# Patient Record
Sex: FEMALE | NOT HISPANIC OR LATINO | URBAN - METROPOLITAN AREA
[De-identification: names, ages, dates, MRNs, and addresses within clinical notes are randomized per-mention and may not be internally consistent; named-entity substitution may affect disease eponyms.]

---

## 2017-03-31 ENCOUNTER — IMPORTED ENCOUNTER (OUTPATIENT)
Dept: URBAN - METROPOLITAN AREA CLINIC 1 | Facility: CLINIC | Age: 47
End: 2017-03-31

## 2017-03-31 PROBLEM — H52.4: Noted: 2017-03-31

## 2017-03-31 PROCEDURE — S0621 ROUTINE OPHTHALMOLOGICAL EXA: HCPCS

## 2017-03-31 NOTE — PATIENT DISCUSSION
1.  Presbyopia: Rx was given for corrective spectacles if indicated. 2.  Return for an appointment in 1 year for 40. with Dr. Lady Melgar.

## 2018-04-30 ENCOUNTER — IMPORTED ENCOUNTER (OUTPATIENT)
Dept: URBAN - METROPOLITAN AREA CLINIC 1 | Facility: CLINIC | Age: 48
End: 2018-04-30

## 2018-04-30 PROBLEM — H52.4: Noted: 2018-04-30

## 2018-04-30 PROCEDURE — S0621 ROUTINE OPHTHALMOLOGICAL EXA: HCPCS

## 2018-04-30 NOTE — PATIENT DISCUSSION
1.  Presbyopia: Rx was given for corrective spectacles if indicated. 2.  Return for an appointment in 1 year for 40. with Dr. Rudi Bella.

## 2019-05-01 ENCOUNTER — IMPORTED ENCOUNTER (OUTPATIENT)
Dept: URBAN - METROPOLITAN AREA CLINIC 1 | Facility: CLINIC | Age: 49
End: 2019-05-01

## 2019-05-01 PROBLEM — H52.13: Noted: 2019-05-01

## 2019-05-01 PROBLEM — H52.4: Noted: 2019-05-01

## 2019-05-01 PROBLEM — H52.223: Noted: 2019-05-01

## 2019-05-01 PROCEDURE — S0621 ROUTINE OPHTHALMOLOGICAL EXA: HCPCS

## 2019-05-01 NOTE — PATIENT DISCUSSION
1. Myopia OU -- Finalized Glasses MRx was given to patient today for correction if indicated and requested2. Astigmatism OU3. Presbyopia OU4. Dry Eyes OU -- Recommend the frequent use of OTC AT's BID-QID OU Routinely (Sample of Blink Given) Return for an appointment in 1 YR for a 40 OU with Dr. Katharine Mae.

## 2022-04-02 ASSESSMENT — VISUAL ACUITY
OS_SC: 20/20
OS_CC: J1+
OD_SC: 20/20
OD_CC: J1+
OD_CC: J1
OS_SC: 20/20
OS_CC: J2
OD_CC: J2
OS_SC: 20/20
OD_SC: 20/20
OS_CC: J1
OD_SC: 20/25-2

## 2022-04-02 ASSESSMENT — TONOMETRY
OD_IOP_MMHG: 16
OS_IOP_MMHG: 16
OD_IOP_MMHG: 15
OS_IOP_MMHG: 16
OD_IOP_MMHG: 17
OS_IOP_MMHG: 17